# Patient Record
Sex: FEMALE | Race: WHITE | URBAN - METROPOLITAN AREA
[De-identification: names, ages, dates, MRNs, and addresses within clinical notes are randomized per-mention and may not be internally consistent; named-entity substitution may affect disease eponyms.]

---

## 2019-06-14 ENCOUNTER — OFFICE VISIT (OUTPATIENT)
Dept: URBAN - METROPOLITAN AREA CLINIC 45 | Facility: CLINIC | Age: 79
End: 2019-06-14
Payer: MEDICARE

## 2019-06-14 PROCEDURE — 92004 COMPRE OPH EXAM NEW PT 1/>: CPT | Performed by: OPTOMETRIST

## 2019-06-14 PROCEDURE — 92133 CPTRZD OPH DX IMG PST SGM ON: CPT | Performed by: OPTOMETRIST

## 2019-06-14 ASSESSMENT — KERATOMETRY
OS: 41.88
OD: 42.00

## 2019-06-14 ASSESSMENT — INTRAOCULAR PRESSURE
OS: 17
OD: 16

## 2019-06-14 NOTE — IMPRESSION/PLAN
Impression: Nonexudative age-related macular degeneration, bilateral, early dry stage: H35.3131.  Plan: Segun Carrillo

## 2019-06-28 ENCOUNTER — OFFICE VISIT (OUTPATIENT)
Dept: URBAN - METROPOLITAN AREA CLINIC 45 | Facility: CLINIC | Age: 79
End: 2019-06-28
Payer: MEDICARE

## 2019-06-28 PROCEDURE — 99213 OFFICE O/P EST LOW 20 MIN: CPT | Performed by: OPTOMETRIST

## 2019-06-28 PROCEDURE — 92083 EXTENDED VISUAL FIELD XM: CPT | Performed by: OPTOMETRIST

## 2019-06-28 ASSESSMENT — INTRAOCULAR PRESSURE
OD: 13
OS: 13

## 2019-06-28 NOTE — IMPRESSION/PLAN
Impression: Open angle with borderline findings, high risk, bilateral: H40.023. Plan: vf done today, few defects, will continue to observe.  Repeat testing every 6 months

## 2019-07-30 ENCOUNTER — OFFICE VISIT (OUTPATIENT)
Dept: URBAN - METROPOLITAN AREA CLINIC 45 | Facility: CLINIC | Age: 79
End: 2019-07-30
Payer: COMMERCIAL

## 2019-07-30 PROCEDURE — 92012 INTRM OPH EXAM EST PATIENT: CPT | Performed by: OPTOMETRIST

## 2019-07-30 ASSESSMENT — VISUAL ACUITY
OS: 20/20
OD: 20/20

## 2019-10-07 ENCOUNTER — OFFICE VISIT (OUTPATIENT)
Dept: URBAN - METROPOLITAN AREA CLINIC 45 | Facility: CLINIC | Age: 79
End: 2019-10-07
Payer: MEDICARE

## 2019-10-07 DIAGNOSIS — H40.023 OPEN ANGLE WITH BORDERLINE FINDINGS, HIGH RISK, BILATERAL: ICD-10-CM

## 2019-10-07 PROCEDURE — 92012 INTRM OPH EXAM EST PATIENT: CPT | Performed by: OPTOMETRIST

## 2019-10-07 ASSESSMENT — KERATOMETRY
OS: 42.38
OD: 41.88

## 2019-10-07 ASSESSMENT — VISUAL ACUITY
OS: 20/25
OD: 20/25

## 2019-10-07 NOTE — IMPRESSION/PLAN
Impression: Combined forms of age-related cataract, bilateral: H25.813. Plan: Reassured patient of current condition and treatment. Will continue to observe condition and or symptoms. Discussed diagnosis in detail with patient.

## 2020-09-11 ENCOUNTER — OFFICE VISIT (OUTPATIENT)
Dept: URBAN - METROPOLITAN AREA CLINIC 45 | Facility: CLINIC | Age: 80
End: 2020-09-11
Payer: MEDICARE

## 2020-09-11 DIAGNOSIS — H35.3131 NONEXUDATIVE AGE-RELATED MACULAR DEGENERATION, BILATERAL, EARLY DRY STAGE: ICD-10-CM

## 2020-09-11 DIAGNOSIS — H52.4 PRESBYOPIA: ICD-10-CM

## 2020-09-11 PROCEDURE — 92134 CPTRZ OPH DX IMG PST SGM RTA: CPT | Performed by: OPTOMETRIST

## 2020-09-11 PROCEDURE — 92014 COMPRE OPH EXAM EST PT 1/>: CPT | Performed by: OPTOMETRIST

## 2020-09-11 ASSESSMENT — INTRAOCULAR PRESSURE
OS: 14
OD: 14

## 2020-09-11 ASSESSMENT — KERATOMETRY
OD: 42.25
OS: 41.75

## 2020-09-11 ASSESSMENT — VISUAL ACUITY
OS: 20/30
OD: 20/30

## 2020-11-17 ENCOUNTER — OFFICE VISIT (OUTPATIENT)
Dept: URBAN - METROPOLITAN AREA CLINIC 45 | Facility: CLINIC | Age: 80
End: 2020-11-17
Payer: MEDICARE

## 2020-11-17 DIAGNOSIS — H04.123 DRY EYE SYNDROME OF BILATERAL LACRIMAL GLANDS: ICD-10-CM

## 2020-11-17 DIAGNOSIS — H25.813 COMBINED FORMS OF AGE-RELATED CATARACT, BILATERAL: Primary | ICD-10-CM

## 2020-11-17 PROCEDURE — 92004 COMPRE OPH EXAM NEW PT 1/>: CPT | Performed by: OPHTHALMOLOGY

## 2020-11-17 RX ORDER — OFLOXACIN 3 MG/ML
0.3 % SOLUTION/ DROPS OPHTHALMIC
Qty: 5 | Refills: 1 | Status: INACTIVE
Start: 2020-11-17 | End: 2021-08-05

## 2020-11-17 RX ORDER — LOTEPREDNOL ETABONATE 3.8 MG/G
0.38 % GEL OPHTHALMIC
Qty: 1 | Refills: 2 | Status: INACTIVE
Start: 2020-11-17 | End: 2021-08-05

## 2020-11-17 ASSESSMENT — INTRAOCULAR PRESSURE
OD: 13
OS: 13

## 2020-11-17 NOTE — IMPRESSION/PLAN
Impression: Combined forms of age-related cataract, bilateral: H25.813. Plan: OK for ce/iol OD then OS in [address] 29, RL3. Need cardio clearance. Distance target. Discussed lens options, Toric/Trifocal. Discussed ORA. Pt is a candidate for premium lens. Discussed need for glasses for near vision with standard IOL and possibly Trifocal as well. Discussed diagnosis of cataracts. Cataracts are limiting vision. Discussed risks, benefits and alternatives to surgery including but not limited to: bleeding, infection, risk of vision loss, loss of the eye, need for other surgery. Patient voiced understanding and wishes to proceed.

## 2021-06-08 ENCOUNTER — OFFICE VISIT (OUTPATIENT)
Dept: URBAN - METROPOLITAN AREA CLINIC 45 | Facility: CLINIC | Age: 81
End: 2021-06-08
Payer: MEDICARE

## 2021-06-08 PROCEDURE — 99214 OFFICE O/P EST MOD 30 MIN: CPT | Performed by: OPHTHALMOLOGY

## 2021-06-08 ASSESSMENT — VISUAL ACUITY
OD: 20/30
OS: 20/30

## 2021-06-08 ASSESSMENT — INTRAOCULAR PRESSURE
OD: 13
OS: 13

## 2021-06-08 ASSESSMENT — KERATOMETRY
OS: 41.88
OD: 42.00

## 2021-06-08 NOTE — IMPRESSION/PLAN
Impression: Combined forms of age-related cataract, bilateral: H25.813. Plan: OK for ce/iol OD then OS in [address] 29, RL3. Clearance obtained. Distance target. Discussed lens options, Toric/Trifocal. Discussed ORA. Discussed intracameral Dexycu/Moxifloxacin. Pt is a candidate for premium lens. Discussed need for glasses for near vision with standard IOL and possibly Trifocal as well. Discussed diagnosis of cataracts. Cataracts are limiting vision. Discussed risks, benefits and alternatives to surgery including but not limited to: bleeding, infection, risk of vision loss, loss of the eye, need for other surgery. Patient voiced understanding and wishes to proceed.

## 2021-07-16 ENCOUNTER — TESTING ONLY (OUTPATIENT)
Dept: URBAN - METROPOLITAN AREA CLINIC 45 | Facility: CLINIC | Age: 81
End: 2021-07-16
Payer: MEDICARE

## 2021-07-16 ASSESSMENT — PACHYMETRY
OD: 3.35
OS: 3.36
OS: 25.56
OD: 25.66

## 2021-07-29 ENCOUNTER — SURGERY (OUTPATIENT)
Dept: URBAN - METROPOLITAN AREA SURGERY 20 | Facility: SURGERY | Age: 81
End: 2021-07-29
Payer: MEDICARE

## 2021-07-29 PROCEDURE — 66984 XCAPSL CTRC RMVL W/O ECP: CPT | Performed by: OPHTHALMOLOGY

## 2021-07-30 ENCOUNTER — POST-OPERATIVE VISIT (OUTPATIENT)
Dept: URBAN - METROPOLITAN AREA CLINIC 45 | Facility: CLINIC | Age: 81
End: 2021-07-30
Payer: MEDICARE

## 2021-07-30 ASSESSMENT — INTRAOCULAR PRESSURE
OS: 13
OD: 12

## 2021-07-30 NOTE — IMPRESSION/PLAN
Impression: S/P Cataract Extraction by phacoemulsification with IOL placement; ORA; DEXYCU OD - 1 Day. Encounter for surgical aftercare following surgery on a sense organ  Z48.810. Plan: keep scheduled appt --Advised patient to use artificial tears for comfort.

## 2021-08-05 ENCOUNTER — POST-OPERATIVE VISIT (OUTPATIENT)
Dept: URBAN - METROPOLITAN AREA CLINIC 45 | Facility: CLINIC | Age: 81
End: 2021-08-05
Payer: MEDICARE

## 2021-08-05 ASSESSMENT — INTRAOCULAR PRESSURE
OD: 12
OS: 12

## 2021-08-05 NOTE — IMPRESSION/PLAN
Impression: S/P Cataract Extraction by phacoemulsification with IOL placement; ORA; DEXYCU OD - 7 Days. Encounter for surgical aftercare following surgery on a sense organ  Z48.810. Post operative instructions reviewed - Plan: --Advised patient to use artificial tears for comfort.

## 2021-08-10 ENCOUNTER — POST-OPERATIVE VISIT (OUTPATIENT)
Dept: URBAN - METROPOLITAN AREA CLINIC 45 | Facility: CLINIC | Age: 81
End: 2021-08-10
Payer: MEDICARE

## 2021-08-10 ASSESSMENT — INTRAOCULAR PRESSURE
OS: 12
OD: 10

## 2021-08-10 NOTE — IMPRESSION/PLAN
Impression: S/P Cataract Extraction by phacoemulsification with IOL placement; ORA; DEXYCU OD - 12 Days. Encounter for surgical aftercare following surgery on a sense organ  Z48.810. Post operative instructions reviewed - Plan: --Advised patient to use artificial tears for comfort.

## 2021-09-14 ENCOUNTER — POST-OPERATIVE VISIT (OUTPATIENT)
Dept: URBAN - METROPOLITAN AREA CLINIC 45 | Facility: CLINIC | Age: 81
End: 2021-09-14
Payer: MEDICARE

## 2021-09-14 RX ORDER — PREDNISOLONE ACETATE 10 MG/ML
1 % SUSPENSION/ DROPS OPHTHALMIC
Qty: 1 | Refills: 0 | Status: ACTIVE
Start: 2021-09-14

## 2021-09-14 ASSESSMENT — INTRAOCULAR PRESSURE: OD: 10

## 2021-09-14 NOTE — IMPRESSION/PLAN
Impression: S/P Cataract Extraction by phacoemulsification with IOL placement; ORA; DEXYCU OD - 47 Days. Encounter for surgical aftercare following surgery on a sense organ  Z48.810. Post operative instructions reviewed -
Discussed Subconj heme with patient. Plan: --Advised patient to use artificial tears for comfort. Pred-acetate qid x 3 days, tid x 3 days, bid x 3 days, qd x 3 days then dc OD. PT VERY CONCERNED ABOUT SUBCONJ HEME, REASSURED PT THAT THERE IS NO COMPLICATIONS AND IT WILL RESOLVE, IMPROVING ALREADY.

## 2021-09-28 ENCOUNTER — POST-OPERATIVE VISIT (OUTPATIENT)
Dept: URBAN - METROPOLITAN AREA CLINIC 45 | Facility: CLINIC | Age: 81
End: 2021-09-28
Payer: MEDICARE

## 2021-09-28 DIAGNOSIS — Z48.810 ENCOUNTER FOR SURGICAL AFTERCARE FOLLOWING SURGERY ON A SENSE ORGAN: Primary | ICD-10-CM

## 2021-09-28 ASSESSMENT — INTRAOCULAR PRESSURE
OS: 12
OD: 10

## 2021-09-28 NOTE — IMPRESSION/PLAN
Impression: S/P Cataract Extraction by phacoemulsification with IOL placement; ORA; DEXYCU OD - 61 Days. Encounter for surgical aftercare following surgery on a sense organ  Z48.810. Excellent post op course   Post operative instructions reviewed - Condition is improving - Plan: --Advised patient to use artificial tears for comfort. Apply warm compresses REASSURED PT THAT SUBCONJ HEME AND INFLAMMATION RESOLVING AND NORMAL PART OF POST OP. PT WANTS A SECOND OPINION AND DIFFERENT SURGEON FOR OS, RECOMMENDED DR. MCCULLOUGH.

## 2021-10-07 ENCOUNTER — OFFICE VISIT (OUTPATIENT)
Dept: URBAN - METROPOLITAN AREA CLINIC 52 | Facility: CLINIC | Age: 81
End: 2021-10-07
Payer: MEDICARE

## 2021-10-07 PROCEDURE — 99024 POSTOP FOLLOW-UP VISIT: CPT | Performed by: OPHTHALMOLOGY

## 2021-10-07 RX ORDER — KETOROLAC TROMETHAMINE 5 MG/ML
0.5 % SOLUTION/ DROPS OPHTHALMIC
Qty: 5 | Refills: 0 | Status: INACTIVE
Start: 2021-10-07 | End: 2021-11-01

## 2021-10-07 ASSESSMENT — INTRAOCULAR PRESSURE
OD: 10
OD: 11
OS: 12

## 2021-10-07 NOTE — IMPRESSION/PLAN
Impression: S/P Cataract Extraction by phacoemulsification with IOL placement; ORA; DEXYCU OD - 61 Days. Encounter for surgical aftercare following surgery on a sense organ  Z48.810. Excellent post op course   Post operative instructions reviewed - Condition is improving - Plan: Will have patient start Ketorolac OD TID - ERx'd into the pharmacy. Advised patient to use GenTeal Gel OU QID. Hand written instructions were given to the patient. Discussed holding off of on surgery on her second eye, until the right eye is completely healed. --Advised patient to use artificial tears for comfort. Apply warm compresses

## 2021-11-01 ENCOUNTER — OFFICE VISIT (OUTPATIENT)
Dept: URBAN - METROPOLITAN AREA CLINIC 52 | Facility: CLINIC | Age: 81
End: 2021-11-01
Payer: MEDICARE

## 2021-11-01 DIAGNOSIS — H02.403 BILATERAL PTOSIS OF EYELIDS: ICD-10-CM

## 2021-11-01 DIAGNOSIS — H43.813 VITREOUS DEGENERATION, BILATERAL: ICD-10-CM

## 2021-11-01 DIAGNOSIS — H25.812 COMBINED FORMS OF AGE-RELATED CATARACT, LEFT EYE: Primary | ICD-10-CM

## 2021-11-01 PROCEDURE — 99214 OFFICE O/P EST MOD 30 MIN: CPT | Performed by: OPHTHALMOLOGY

## 2021-11-01 RX ORDER — KETOROLAC TROMETHAMINE 5 MG/ML
0.5 % SOLUTION/ DROPS OPHTHALMIC
Qty: 5 | Refills: 1 | Status: ACTIVE
Start: 2021-11-01

## 2021-11-01 ASSESSMENT — INTRAOCULAR PRESSURE
OD: 11
OD: 16
OS: 12
OS: 16

## 2021-11-01 ASSESSMENT — VISUAL ACUITY: OS: 20/30

## 2021-11-01 NOTE — IMPRESSION/PLAN
Impression: Combined forms of age-related cataract, left eye: H25.812. Plan: Ok for CEIOL OS  in [address] 29, [address]. Distance target. Discussed need for glasses for near vision with standard. Discussed diagnosis of cataracts. Cataracts are limiting vision. Discussed risks, benefits and alternatives to surgery including but not limited to: bleeding, infection, risk of vision loss, loss of the eye, need for other surgery. Patient voiced understanding and wishes to proceed. Discussed doing Dexycu at the time of surgery. Will have patient start Ketorolac TID x 3 weeks then discontinue - starting one day prior to surgery. Use [id number] +17. 0

## 2021-11-01 NOTE — IMPRESSION/PLAN
Impression: Bilateral ptosis of eyelids: H02.403. Plan: Discussed diagnosis with patient. Gave patient a sample of Upneeq. Advised patient that if Nasreen Salgado works and patient does notice an improvement, we can send in an Rx into [facility]. Patient understands and agrees with plan.

## 2022-05-24 ENCOUNTER — SURGERY (OUTPATIENT)
Dept: URBAN - METROPOLITAN AREA SURGERY 20 | Facility: SURGERY | Age: 82
End: 2022-05-24
Payer: MEDICARE

## 2022-05-24 PROCEDURE — PR1CP PR1CP: CUSTOM | Performed by: OPHTHALMOLOGY

## 2022-05-24 PROCEDURE — 66984 XCAPSL CTRC RMVL W/O ECP: CPT | Performed by: OPHTHALMOLOGY

## 2022-05-25 ENCOUNTER — POST-OPERATIVE VISIT (OUTPATIENT)
Dept: URBAN - METROPOLITAN AREA CLINIC 45 | Facility: CLINIC | Age: 82
End: 2022-05-25
Payer: MEDICARE

## 2022-05-25 DIAGNOSIS — Z96.1 PRESENCE OF INTRAOCULAR LENS: Primary | ICD-10-CM

## 2022-05-25 PROCEDURE — 99024 POSTOP FOLLOW-UP VISIT: CPT | Performed by: OPTOMETRIST

## 2022-05-25 ASSESSMENT — INTRAOCULAR PRESSURE
OS: 14
OD: 12

## 2022-05-25 NOTE — IMPRESSION/PLAN
Impression: S/P Cataract Extraction by phacoemulsification with IOL placement; DEXYCU OS - 1 Day. Presence of intraocular lens  Z96.1. Excellent post op course   Post operative instructions reviewed - Plan: --Advised patient to use artificial tears for comfort. --Continue Ketorolac 0.5% TID OS.

## 2022-06-02 ENCOUNTER — POST-OPERATIVE VISIT (OUTPATIENT)
Dept: URBAN - METROPOLITAN AREA CLINIC 52 | Facility: CLINIC | Age: 82
End: 2022-06-02
Payer: MEDICARE

## 2022-06-02 DIAGNOSIS — Z96.1 PRESENCE OF INTRAOCULAR LENS: Primary | ICD-10-CM

## 2022-06-02 PROCEDURE — 99024 POSTOP FOLLOW-UP VISIT: CPT | Performed by: OPHTHALMOLOGY

## 2022-06-02 ASSESSMENT — INTRAOCULAR PRESSURE
OS: 14
OD: 11

## 2022-06-02 NOTE — IMPRESSION/PLAN
Impression: S/P Cataract Extraction by phacoemulsification with IOL placement; DEXYCU OS - 9 Days. Presence of intraocular lens  Z96.1. Excellent post op course   Post operative instructions reviewed - Plan: Recommended OTC readers for NVA.  Continue Ketoralac TID OS

## 2022-06-22 ENCOUNTER — POST-OPERATIVE VISIT (OUTPATIENT)
Dept: URBAN - METROPOLITAN AREA CLINIC 52 | Facility: CLINIC | Age: 82
End: 2022-06-22
Payer: MEDICARE

## 2022-06-22 DIAGNOSIS — Z96.1 PRESENCE OF INTRAOCULAR LENS: Primary | ICD-10-CM

## 2022-06-22 PROCEDURE — 99024 POSTOP FOLLOW-UP VISIT: CPT | Performed by: OPTOMETRIST

## 2022-06-22 ASSESSMENT — INTRAOCULAR PRESSURE
OD: 10
OS: 12

## 2022-06-22 NOTE — IMPRESSION/PLAN
Impression: S/P Cataract Extraction by phacoemulsification with IOL placement; DEXYCU OS - 29 Days. Presence of intraocular lens  Z96.1. Post operative instructions reviewed - Plan: Discussed findings, healing well. Patient to call if any sudden vision changes, pain, flashes of light, new floaters, or curtain/veil of vision loss. Likely aponeurotic etiology of ptosis and coincidental. No diplopia, no APD, no new neck pain, vision loss, papilledema, evidence of facial palsies, muscle weakness, or slurred speech. Will monitor in 3mos and consider Upneeq/ptosis repair if condition persists or worsens. Advised patient to RTC immediately if condition worsens or she experiences diplopia, neck/scalp pain, new headache, or vision loss. --Advised patient to use artificial tears for comfort.

## 2022-10-31 ENCOUNTER — OFFICE VISIT (OUTPATIENT)
Dept: URBAN - METROPOLITAN AREA CLINIC 52 | Facility: CLINIC | Age: 82
End: 2022-10-31
Payer: MEDICARE

## 2022-10-31 DIAGNOSIS — H04.123 DRY EYE SYNDROME OF BILATERAL LACRIMAL GLANDS: ICD-10-CM

## 2022-10-31 DIAGNOSIS — H02.403 BILATERAL PTOSIS OF EYELIDS: Primary | ICD-10-CM

## 2022-10-31 PROCEDURE — 99214 OFFICE O/P EST MOD 30 MIN: CPT | Performed by: OPHTHALMOLOGY

## 2022-10-31 RX ORDER — METOPROLOL TARTRATE 25 MG/1
25 MG TABLET, FILM COATED ORAL
Qty: 0 | Refills: 0 | Status: ACTIVE
Start: 2022-10-31

## 2022-10-31 ASSESSMENT — INTRAOCULAR PRESSURE
OS: 14
OD: 14

## 2022-10-31 NOTE — IMPRESSION/PLAN
Impression: Bilateral ptosis of eyelids: H02.403. Plan: Discussed diagnosis in detail with patient. Discussed treatment options with patient. No treatment is required at this time. Reassured patient of current condition and treatment. Will continue to observe condition and or symptoms. Patient instructed to call if condition gets worse.

## 2024-11-20 ENCOUNTER — OFFICE VISIT (OUTPATIENT)
Dept: URBAN - METROPOLITAN AREA CLINIC 52 | Facility: CLINIC | Age: 84
End: 2024-11-20
Payer: MEDICARE

## 2024-11-20 DIAGNOSIS — H02.403 BILATERAL PTOSIS OF EYELIDS: ICD-10-CM

## 2024-11-20 DIAGNOSIS — H04.123 DRY EYE SYNDROME OF BILATERAL LACRIMAL GLANDS: Primary | ICD-10-CM

## 2024-11-20 DIAGNOSIS — H35.3131 NONEXUDATIVE MACULAR DEGENERATION, EARLY DRY STAGE, BILATERAL: ICD-10-CM

## 2024-11-20 DIAGNOSIS — H43.813 VITREOUS DEGENERATION, BILATERAL: ICD-10-CM

## 2024-11-20 DIAGNOSIS — Z96.1 PRESENCE OF INTRAOCULAR LENS: ICD-10-CM

## 2024-11-20 PROCEDURE — 92014 COMPRE OPH EXAM EST PT 1/>: CPT | Performed by: OPTOMETRIST

## 2024-11-20 PROCEDURE — 92134 CPTRZ OPH DX IMG PST SGM RTA: CPT | Performed by: OPTOMETRIST

## 2024-11-20 ASSESSMENT — INTRAOCULAR PRESSURE
OD: 13
OS: 13

## 2024-11-20 ASSESSMENT — VISUAL ACUITY
OS: 20/20
OD: 20/20